# Patient Record
Sex: MALE | Race: WHITE
[De-identification: names, ages, dates, MRNs, and addresses within clinical notes are randomized per-mention and may not be internally consistent; named-entity substitution may affect disease eponyms.]

---

## 2023-10-25 ENCOUNTER — HOSPITAL ENCOUNTER (EMERGENCY)
Dept: HOSPITAL 47 - EC | Age: 43
Discharge: HOME | End: 2023-10-25
Payer: COMMERCIAL

## 2023-10-25 VITALS
TEMPERATURE: 98.9 F | HEART RATE: 83 BPM | RESPIRATION RATE: 18 BRPM | DIASTOLIC BLOOD PRESSURE: 88 MMHG | SYSTOLIC BLOOD PRESSURE: 150 MMHG

## 2023-10-25 DIAGNOSIS — Z02.83: Primary | ICD-10-CM

## 2023-10-25 DIAGNOSIS — F17.200: ICD-10-CM

## 2023-10-25 PROCEDURE — 99282 EMERGENCY DEPT VISIT SF MDM: CPT
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
